# Patient Record
Sex: MALE | Race: BLACK OR AFRICAN AMERICAN | NOT HISPANIC OR LATINO | Employment: FULL TIME | URBAN - METROPOLITAN AREA
[De-identification: names, ages, dates, MRNs, and addresses within clinical notes are randomized per-mention and may not be internally consistent; named-entity substitution may affect disease eponyms.]

---

## 2020-09-16 ENCOUNTER — OFFICE VISIT (OUTPATIENT)
Dept: SURGERY | Facility: CLINIC | Age: 40
End: 2020-09-16
Payer: MEDICAID

## 2020-09-16 VITALS
HEIGHT: 76 IN | SYSTOLIC BLOOD PRESSURE: 159 MMHG | WEIGHT: 243.38 LBS | DIASTOLIC BLOOD PRESSURE: 66 MMHG | HEART RATE: 75 BPM | BODY MASS INDEX: 29.64 KG/M2

## 2020-09-16 DIAGNOSIS — K40.90 UNILATERAL INGUINAL HERNIA WITHOUT OBSTRUCTION OR GANGRENE, RECURRENCE NOT SPECIFIED: Primary | ICD-10-CM

## 2020-09-16 PROCEDURE — 99204 PR OFFICE/OUTPT VISIT, NEW, LEVL IV, 45-59 MIN: ICD-10-PCS | Mod: S$GLB,,, | Performed by: SURGERY

## 2020-09-16 PROCEDURE — 99204 OFFICE O/P NEW MOD 45 MIN: CPT | Mod: S$GLB,,, | Performed by: SURGERY

## 2020-09-16 RX ORDER — MUPIROCIN 20 MG/G
OINTMENT TOPICAL
Status: CANCELLED | OUTPATIENT
Start: 2020-09-16

## 2020-09-16 RX ORDER — SODIUM CHLORIDE 9 MG/ML
INJECTION, SOLUTION INTRAVENOUS CONTINUOUS
Status: CANCELLED | OUTPATIENT
Start: 2020-09-16

## 2020-09-16 NOTE — PROGRESS NOTES
"History & Physical    SUBJECTIVE:     History of Present Illness:  Patient is a 40 y.o. male presents with symptomatic right inguinal hernia.      Chief Complaint   Patient presents with    Consult    Inguinal Hernia     rt side        Review of patient's allergies indicates:  No Known Allergies    No current outpatient medications on file.     No current facility-administered medications for this visit.        History reviewed. No pertinent past medical history.  History reviewed. No pertinent surgical history.  History reviewed. No pertinent family history.  Social History     Tobacco Use    Smoking status: Never Smoker    Smokeless tobacco: Never Used   Substance Use Topics    Alcohol use: Not on file    Drug use: Not on file        Review of Systems:  Review of Systems   Constitutional: Negative.    HENT: Negative.    Eyes: Negative.    Respiratory: Negative.    Cardiovascular: Negative.    Gastrointestinal: Negative.    Endocrine: Negative.    Musculoskeletal: Negative.    Skin: Negative.    Allergic/Immunologic: Negative.    Neurological: Negative.    Hematological: Negative.    Psychiatric/Behavioral: Negative.    All other systems reviewed and are negative.      OBJECTIVE:     Vital Signs (Most Recent)  Pulse: 75 (09/16/20 1341)  BP: (!) 159/66 (09/16/20 1341)  6' 4" (1.93 m)  110.4 kg (243 lb 6.2 oz)     Physical Exam:  Physical Exam  Vitals signs reviewed.   Constitutional:       Appearance: He is well-developed.   HENT:      Head: Normocephalic and atraumatic.      Right Ear: External ear normal.      Left Ear: External ear normal.      Nose: Nose normal.   Eyes:      Conjunctiva/sclera: Conjunctivae normal.      Pupils: Pupils are equal, round, and reactive to light.   Neck:      Musculoskeletal: Normal range of motion and neck supple.   Cardiovascular:      Rate and Rhythm: Normal rate and regular rhythm.      Heart sounds: Normal heart sounds.   Pulmonary:      Effort: Pulmonary effort is normal. "      Breath sounds: Normal breath sounds.   Abdominal:      General: Bowel sounds are normal.      Palpations: Abdomen is soft.      Hernia: A hernia is present. Hernia is present in the right inguinal area.   Genitourinary:      Musculoskeletal: Normal range of motion.   Skin:     General: Skin is warm and dry.   Neurological:      Mental Status: He is alert and oriented to person, place, and time.      Deep Tendon Reflexes: Reflexes are normal and symmetric.   Psychiatric:         Behavior: Behavior normal.         Thought Content: Thought content normal.         Laboratory  none    Diagnostic Results:  none    ASSESSMENT/PLAN:     Right inguinal hernia    PLAN:Plan     To the OR for repair of right inguinal hernia with the risks explained and he agrees to proceed

## 2020-09-30 ENCOUNTER — HOSPITAL ENCOUNTER (OUTPATIENT)
Dept: PREADMISSION TESTING | Facility: HOSPITAL | Age: 40
Discharge: HOME | End: 2020-09-30
Attending: SURGERY
Payer: MEDICAID

## 2020-09-30 VITALS
OXYGEN SATURATION: 100 % | SYSTOLIC BLOOD PRESSURE: 146 MMHG | BODY MASS INDEX: 30.5 KG/M2 | HEIGHT: 76 IN | DIASTOLIC BLOOD PRESSURE: 85 MMHG | WEIGHT: 250.44 LBS | RESPIRATION RATE: 16 BRPM | HEART RATE: 55 BPM

## 2020-09-30 DIAGNOSIS — K40.90 UNILATERAL INGUINAL HERNIA WITHOUT OBSTRUCTION OR GANGRENE, RECURRENCE NOT SPECIFIED: ICD-10-CM

## 2020-09-30 LAB
ALBUMIN SERPL BCP-MCNC: 4 G/DL (ref 3.5–5.2)
ALP SERPL-CCNC: 54 U/L (ref 55–135)
ALT SERPL W/O P-5'-P-CCNC: 29 U/L (ref 10–44)
ANION GAP SERPL CALC-SCNC: 7 MMOL/L (ref 8–16)
AST SERPL-CCNC: 26 U/L (ref 10–40)
BASOPHILS # BLD AUTO: 0.02 K/UL (ref 0–0.2)
BASOPHILS NFR BLD: 0.2 % (ref 0–1.9)
BILIRUB SERPL-MCNC: 0.5 MG/DL (ref 0.1–1)
BUN SERPL-MCNC: 13 MG/DL (ref 6–20)
CALCIUM SERPL-MCNC: 8.9 MG/DL (ref 8.7–10.5)
CHLORIDE SERPL-SCNC: 105 MMOL/L (ref 95–110)
CO2 SERPL-SCNC: 26 MMOL/L (ref 23–29)
CREAT SERPL-MCNC: 1 MG/DL (ref 0.5–1.4)
DIFFERENTIAL METHOD: ABNORMAL
EOSINOPHIL # BLD AUTO: 0.1 K/UL (ref 0–0.5)
EOSINOPHIL NFR BLD: 1.5 % (ref 0–8)
ERYTHROCYTE [DISTWIDTH] IN BLOOD BY AUTOMATED COUNT: 15.4 % (ref 11.5–14.5)
EST. GFR  (AFRICAN AMERICAN): >60 ML/MIN/1.73 M^2
EST. GFR  (NON AFRICAN AMERICAN): >60 ML/MIN/1.73 M^2
GLUCOSE SERPL-MCNC: 88 MG/DL (ref 70–110)
HCT VFR BLD AUTO: 35.9 % (ref 40–54)
HGB BLD-MCNC: 11.7 G/DL (ref 14–18)
IMM GRANULOCYTES # BLD AUTO: 0.03 K/UL (ref 0–0.04)
IMM GRANULOCYTES NFR BLD AUTO: 0.3 % (ref 0–0.5)
LYMPHOCYTES # BLD AUTO: 3.5 K/UL (ref 1–4.8)
LYMPHOCYTES NFR BLD: 36.6 % (ref 18–48)
MCH RBC QN AUTO: 22.1 PG (ref 27–31)
MCHC RBC AUTO-ENTMCNC: 32.6 G/DL (ref 32–36)
MCV RBC AUTO: 68 FL (ref 82–98)
MONOCYTES # BLD AUTO: 1.1 K/UL (ref 0.3–1)
MONOCYTES NFR BLD: 11.6 % (ref 4–15)
NEUTROPHILS # BLD AUTO: 4.8 K/UL (ref 1.8–7.7)
NEUTROPHILS NFR BLD: 49.8 % (ref 38–73)
NRBC BLD-RTO: 0 /100 WBC
PLATELET # BLD AUTO: 218 K/UL (ref 150–350)
PMV BLD AUTO: 9.8 FL (ref 9.2–12.9)
POTASSIUM SERPL-SCNC: 3.8 MMOL/L (ref 3.5–5.1)
PROT SERPL-MCNC: 6.9 G/DL (ref 6–8.4)
RBC # BLD AUTO: 5.29 M/UL (ref 4.6–6.2)
SODIUM SERPL-SCNC: 138 MMOL/L (ref 136–145)
WBC # BLD AUTO: 9.54 K/UL (ref 3.9–12.7)

## 2020-09-30 PROCEDURE — 36415 COLL VENOUS BLD VENIPUNCTURE: CPT

## 2020-09-30 PROCEDURE — 80053 COMPREHEN METABOLIC PANEL: CPT

## 2020-09-30 PROCEDURE — 85025 COMPLETE CBC W/AUTO DIFF WBC: CPT

## 2020-09-30 NOTE — DISCHARGE INSTRUCTIONS
Your procedure  is scheduled for __10/6/2020________.    Call [phone] between 2pm and 5pm on _10/5/2020______to find out your arrival time for the day of surgery.    Report to the Emergency Department on the day of your surgery.  You will be escorted to the admitting unit.    Important instructions:   Do not eat or drink after 12 midnight, including water.  It is okay to brush your teeth.  Do not have gum, candy or mints.      Stop taking Aspirin, Ibuprofen, Motrin and Aleve , Fish oil, and Vitamin E for at least 7 days before your surgery. You may use Tylenol unless otherwise instructed by your doctor.          Return to patient registration through the Emergency Room as previously on__10/5/2020________ for  Covid test.       Please shower the night before and the morning of your surgery.        Use Hibiclens soap as instructed by your pre op nurse.   Please place clean linens on your bed the night before surgery. Please wear fresh clean clothing after each shower.     No shaving of procedural area at least 4-5 days before surgery due to increased risk of skin irritation and/or possible infection.      You may wear deodorant only.      Do not wear powder, body lotion or perfume/cologne.     Do not wear any jewelry or have any metal on your body.     You will be asked to remove any dentures or partials for the procedure.     Please bring any documents given to you by your doctor.     If you are going home on the same day of surgery, you must arrange for a family member or a friend to drive you home.  Public transportation is prohibited.  You will not be able to drive home if you were given anesthesia or sedation.     Wear loose fitting clothes allowing for bandages.     Please leave money and valuables home.       You may bring your cell phone.     Call the doctor if fever or illness should occur before your surgery.    Call [phone] to contact us here if needed.

## 2020-10-05 ENCOUNTER — ANESTHESIA EVENT (OUTPATIENT)
Dept: SURGERY | Facility: HOSPITAL | Age: 40
End: 2020-10-05
Payer: MEDICAID

## 2020-10-05 ENCOUNTER — HOSPITAL ENCOUNTER (OUTPATIENT)
Dept: PREADMISSION TESTING | Facility: HOSPITAL | Age: 40
Discharge: HOME | End: 2020-10-05
Attending: SURGERY
Payer: MEDICAID

## 2020-10-05 DIAGNOSIS — Z01.818 PREOP TESTING: ICD-10-CM

## 2020-10-05 LAB — SARS-COV-2 RDRP RESP QL NAA+PROBE: NEGATIVE

## 2020-10-05 PROCEDURE — U0002 COVID-19 LAB TEST NON-CDC: HCPCS

## 2020-10-06 ENCOUNTER — HOSPITAL ENCOUNTER (OUTPATIENT)
Facility: HOSPITAL | Age: 40
Discharge: HOME | End: 2020-10-06
Attending: SURGERY | Admitting: SURGERY
Payer: MEDICAID

## 2020-10-06 ENCOUNTER — ANESTHESIA (OUTPATIENT)
Dept: SURGERY | Facility: HOSPITAL | Age: 40
End: 2020-10-06
Payer: MEDICAID

## 2020-10-06 VITALS
RESPIRATION RATE: 20 BRPM | BODY MASS INDEX: 30.48 KG/M2 | WEIGHT: 250.44 LBS | DIASTOLIC BLOOD PRESSURE: 77 MMHG | TEMPERATURE: 98 F | HEART RATE: 60 BPM | SYSTOLIC BLOOD PRESSURE: 144 MMHG | OXYGEN SATURATION: 100 %

## 2020-10-06 DIAGNOSIS — Z01.818 PREOP TESTING: ICD-10-CM

## 2020-10-06 DIAGNOSIS — K40.90 UNILATERAL INGUINAL HERNIA WITHOUT OBSTRUCTION OR GANGRENE, RECURRENCE NOT SPECIFIED: Primary | ICD-10-CM

## 2020-10-06 LAB — POCT GLUCOSE: 103 MG/DL (ref 70–110)

## 2020-10-06 PROCEDURE — S0020 INJECTION, BUPIVICAINE HYDRO: HCPCS | Performed by: SURGERY

## 2020-10-06 PROCEDURE — 49505 PR REPAIR ING HERNIA,5+Y/O,REDUCIBL: ICD-10-PCS | Mod: RT,,, | Performed by: SURGERY

## 2020-10-06 PROCEDURE — D9220A PRA ANESTHESIA: Mod: ANES,,, | Performed by: ANESTHESIOLOGY

## 2020-10-06 PROCEDURE — 36000707: Performed by: SURGERY

## 2020-10-06 PROCEDURE — 00830 ANES HERNIA RPR LWR ABD NOS: CPT | Performed by: SURGERY

## 2020-10-06 PROCEDURE — 49505 PRP I/HERN INIT REDUC >5 YR: CPT | Mod: RT,,, | Performed by: SURGERY

## 2020-10-06 PROCEDURE — 25000003 PHARM REV CODE 250: Performed by: NURSE ANESTHETIST, CERTIFIED REGISTERED

## 2020-10-06 PROCEDURE — 63600175 PHARM REV CODE 636 W HCPCS: Performed by: SURGERY

## 2020-10-06 PROCEDURE — D9220A PRA ANESTHESIA: Mod: CRNA,,, | Performed by: NURSE ANESTHETIST, CERTIFIED REGISTERED

## 2020-10-06 PROCEDURE — 63600175 PHARM REV CODE 636 W HCPCS: Performed by: NURSE ANESTHETIST, CERTIFIED REGISTERED

## 2020-10-06 PROCEDURE — 37000009 HC ANESTHESIA EA ADD 15 MINS: Performed by: SURGERY

## 2020-10-06 PROCEDURE — 25000003 PHARM REV CODE 250: Performed by: SURGERY

## 2020-10-06 PROCEDURE — C1781 MESH (IMPLANTABLE): HCPCS | Performed by: SURGERY

## 2020-10-06 PROCEDURE — 37000008 HC ANESTHESIA 1ST 15 MINUTES: Performed by: SURGERY

## 2020-10-06 PROCEDURE — 36000706: Performed by: SURGERY

## 2020-10-06 PROCEDURE — 71000039 HC RECOVERY, EACH ADD'L HOUR: Performed by: SURGERY

## 2020-10-06 PROCEDURE — 88302 TISSUE EXAM BY PATHOLOGIST: CPT | Performed by: PATHOLOGY

## 2020-10-06 PROCEDURE — 88302 TISSUE EXAM BY PATHOLOGIST: CPT | Mod: 26,,, | Performed by: PATHOLOGY

## 2020-10-06 PROCEDURE — 82962 GLUCOSE BLOOD TEST: CPT | Performed by: SURGERY

## 2020-10-06 PROCEDURE — D9220A PRA ANESTHESIA: ICD-10-PCS | Mod: ANES,,, | Performed by: ANESTHESIOLOGY

## 2020-10-06 PROCEDURE — 88302 PR  SURG PATH,LEVEL II: ICD-10-PCS | Mod: 26,,, | Performed by: PATHOLOGY

## 2020-10-06 PROCEDURE — 71000016 HC POSTOP RECOV ADDL HR: Performed by: SURGERY

## 2020-10-06 PROCEDURE — D9220A PRA ANESTHESIA: ICD-10-PCS | Mod: CRNA,,, | Performed by: NURSE ANESTHETIST, CERTIFIED REGISTERED

## 2020-10-06 PROCEDURE — 71000033 HC RECOVERY, INTIAL HOUR: Performed by: SURGERY

## 2020-10-06 PROCEDURE — 63600175 PHARM REV CODE 636 W HCPCS: Performed by: ANESTHESIOLOGY

## 2020-10-06 PROCEDURE — 71000015 HC POSTOP RECOV 1ST HR: Performed by: SURGERY

## 2020-10-06 DEVICE — MESH PROLENE HERNIA 3IN: Type: IMPLANTABLE DEVICE | Site: GROIN | Status: FUNCTIONAL

## 2020-10-06 RX ORDER — MIDAZOLAM HYDROCHLORIDE 1 MG/ML
INJECTION, SOLUTION INTRAMUSCULAR; INTRAVENOUS
Status: DISCONTINUED | OUTPATIENT
Start: 2020-10-06 | End: 2020-10-06

## 2020-10-06 RX ORDER — NEOSTIGMINE METHYLSULFATE 1 MG/ML
INJECTION, SOLUTION INTRAVENOUS
Status: DISCONTINUED | OUTPATIENT
Start: 2020-10-06 | End: 2020-10-06

## 2020-10-06 RX ORDER — PROPOFOL 10 MG/ML
VIAL (ML) INTRAVENOUS
Status: DISCONTINUED | OUTPATIENT
Start: 2020-10-06 | End: 2020-10-06

## 2020-10-06 RX ORDER — ACETAMINOPHEN 10 MG/ML
1000 INJECTION, SOLUTION INTRAVENOUS ONCE
Status: COMPLETED | OUTPATIENT
Start: 2020-10-06 | End: 2020-10-06

## 2020-10-06 RX ORDER — OXYCODONE AND ACETAMINOPHEN 5; 325 MG/1; MG/1
1 TABLET ORAL EVERY 4 HOURS PRN
Status: DISCONTINUED | OUTPATIENT
Start: 2020-10-06 | End: 2020-10-06 | Stop reason: HOSPADM

## 2020-10-06 RX ORDER — FENTANYL CITRATE 50 UG/ML
INJECTION, SOLUTION INTRAMUSCULAR; INTRAVENOUS
Status: DISCONTINUED | OUTPATIENT
Start: 2020-10-06 | End: 2020-10-06

## 2020-10-06 RX ORDER — SODIUM CHLORIDE 0.9 % (FLUSH) 0.9 %
10 SYRINGE (ML) INJECTION
Status: DISCONTINUED | OUTPATIENT
Start: 2020-10-06 | End: 2020-10-06 | Stop reason: HOSPADM

## 2020-10-06 RX ORDER — MORPHINE SULFATE 10 MG/ML
3 INJECTION INTRAMUSCULAR; INTRAVENOUS; SUBCUTANEOUS
Status: DISCONTINUED | OUTPATIENT
Start: 2020-10-06 | End: 2020-10-06 | Stop reason: HOSPADM

## 2020-10-06 RX ORDER — OXYCODONE AND ACETAMINOPHEN 5; 325 MG/1; MG/1
1 TABLET ORAL EVERY 4 HOURS PRN
Qty: 30 TABLET | Refills: 0 | Status: SHIPPED | OUTPATIENT
Start: 2020-10-06

## 2020-10-06 RX ORDER — SODIUM CHLORIDE 9 MG/ML
INJECTION, SOLUTION INTRAVENOUS CONTINUOUS
Status: DISCONTINUED | OUTPATIENT
Start: 2020-10-06 | End: 2020-10-06 | Stop reason: HOSPADM

## 2020-10-06 RX ORDER — CEFAZOLIN SODIUM 2 G/50ML
2 SOLUTION INTRAVENOUS
Status: COMPLETED | OUTPATIENT
Start: 2020-10-06 | End: 2020-10-06

## 2020-10-06 RX ORDER — ROPIVACAINE/EPI/CLONIDINE/KET 2.46-0.005
SYRINGE (ML) INJECTION ONCE
Status: COMPLETED | OUTPATIENT
Start: 2020-10-06 | End: 2020-10-06

## 2020-10-06 RX ORDER — ROCURONIUM BROMIDE 10 MG/ML
INJECTION, SOLUTION INTRAVENOUS
Status: DISCONTINUED | OUTPATIENT
Start: 2020-10-06 | End: 2020-10-06

## 2020-10-06 RX ORDER — ONDANSETRON 2 MG/ML
4 INJECTION INTRAMUSCULAR; INTRAVENOUS DAILY PRN
Status: DISCONTINUED | OUTPATIENT
Start: 2020-10-06 | End: 2020-10-06 | Stop reason: HOSPADM

## 2020-10-06 RX ORDER — MUPIROCIN 20 MG/G
OINTMENT TOPICAL
Status: DISCONTINUED | OUTPATIENT
Start: 2020-10-06 | End: 2020-10-06 | Stop reason: HOSPADM

## 2020-10-06 RX ORDER — HYDROMORPHONE HYDROCHLORIDE 2 MG/ML
0.2 INJECTION, SOLUTION INTRAMUSCULAR; INTRAVENOUS; SUBCUTANEOUS EVERY 5 MIN PRN
Status: DISCONTINUED | OUTPATIENT
Start: 2020-10-06 | End: 2020-10-06 | Stop reason: HOSPADM

## 2020-10-06 RX ORDER — ROPIVACAINE/EPI/CLONIDINE/KET 2.46-0.005
SYRINGE (ML) INJECTION
Status: DISCONTINUED | OUTPATIENT
Start: 2020-10-06 | End: 2020-10-06 | Stop reason: HOSPADM

## 2020-10-06 RX ORDER — DEXAMETHASONE SODIUM PHOSPHATE 4 MG/ML
INJECTION, SOLUTION INTRA-ARTICULAR; INTRALESIONAL; INTRAMUSCULAR; INTRAVENOUS; SOFT TISSUE
Status: DISCONTINUED | OUTPATIENT
Start: 2020-10-06 | End: 2020-10-06

## 2020-10-06 RX ORDER — LIDOCAINE HCL/PF 100 MG/5ML
SYRINGE (ML) INTRAVENOUS
Status: DISCONTINUED | OUTPATIENT
Start: 2020-10-06 | End: 2020-10-06

## 2020-10-06 RX ORDER — BUPIVACAINE HYDROCHLORIDE 2.5 MG/ML
INJECTION, SOLUTION INFILTRATION; PERINEURAL
Status: DISCONTINUED | OUTPATIENT
Start: 2020-10-06 | End: 2020-10-06 | Stop reason: HOSPADM

## 2020-10-06 RX ORDER — PHENYLEPHRINE HYDROCHLORIDE 10 MG/ML
INJECTION INTRAVENOUS
Status: DISCONTINUED | OUTPATIENT
Start: 2020-10-06 | End: 2020-10-06

## 2020-10-06 RX ADMIN — LIDOCAINE HYDROCHLORIDE 50 MG: 20 INJECTION, SOLUTION INTRAVENOUS at 11:10

## 2020-10-06 RX ADMIN — ROCURONIUM BROMIDE 50 MG: 10 INJECTION, SOLUTION INTRAVENOUS at 11:10

## 2020-10-06 RX ADMIN — MUPIROCIN: 20 OINTMENT TOPICAL at 08:10

## 2020-10-06 RX ADMIN — FENTANYL CITRATE 25 MCG: 50 INJECTION INTRAMUSCULAR; INTRAVENOUS at 12:10

## 2020-10-06 RX ADMIN — GLYCOPYRROLATE 0.8 MG: 0.2 INJECTION, SOLUTION INTRAMUSCULAR; INTRAVITREAL at 12:10

## 2020-10-06 RX ADMIN — FENTANYL CITRATE 25 MCG: 50 INJECTION INTRAMUSCULAR; INTRAVENOUS at 11:10

## 2020-10-06 RX ADMIN — MIDAZOLAM HYDROCHLORIDE 2 MG: 1 INJECTION, SOLUTION INTRAMUSCULAR; INTRAVENOUS at 10:10

## 2020-10-06 RX ADMIN — HYDROMORPHONE HYDROCHLORIDE 0.2 MG: 2 INJECTION INTRAMUSCULAR; INTRAVENOUS; SUBCUTANEOUS at 12:10

## 2020-10-06 RX ADMIN — MIDAZOLAM HYDROCHLORIDE 2 MG: 1 INJECTION, SOLUTION INTRAMUSCULAR; INTRAVENOUS at 11:10

## 2020-10-06 RX ADMIN — OXYCODONE HYDROCHLORIDE AND ACETAMINOPHEN 1 TABLET: 5; 325 TABLET ORAL at 01:10

## 2020-10-06 RX ADMIN — DEXAMETHASONE SODIUM PHOSPHATE 4 MG: 4 INJECTION, SOLUTION INTRAMUSCULAR; INTRAVENOUS at 11:10

## 2020-10-06 RX ADMIN — Medication: at 12:10

## 2020-10-06 RX ADMIN — ACETAMINOPHEN 1000 MG: 10 INJECTION, SOLUTION INTRAVENOUS at 12:10

## 2020-10-06 RX ADMIN — FENTANYL CITRATE 100 MCG: 50 INJECTION INTRAMUSCULAR; INTRAVENOUS at 10:10

## 2020-10-06 RX ADMIN — SODIUM CHLORIDE: 0.9 INJECTION, SOLUTION INTRAVENOUS at 08:10

## 2020-10-06 RX ADMIN — PHENYLEPHRINE HYDROCHLORIDE 50 MCG: 10 INJECTION INTRAVENOUS at 11:10

## 2020-10-06 RX ADMIN — CEFAZOLIN SODIUM 2 G: 2 SOLUTION INTRAVENOUS at 11:10

## 2020-10-06 RX ADMIN — NEOSTIGMINE METHYLSULFATE 5 MG: 1 INJECTION INTRAVENOUS at 12:10

## 2020-10-06 RX ADMIN — PROPOFOL 200 MG: 10 INJECTION, EMULSION INTRAVENOUS at 11:10

## 2020-10-06 RX ADMIN — GLYCOPYRROLATE 0.2 MG: 0.2 INJECTION, SOLUTION INTRAMUSCULAR; INTRAVITREAL at 11:10

## 2020-10-06 NOTE — ANESTHESIA POSTPROCEDURE EVALUATION
Anesthesia Post Evaluation    Patient: Travis De Oliveira    Procedure(s) Performed: Procedure(s) (LRB):  REPAIR, HERNIA, INGUINAL, WITHOUT HISTORY OF PRIOR REPAIR, AGE 5 YEARS OR OLDER (Right)    Final Anesthesia Type: general    Patient location during evaluation: PACU  Patient participation: Yes- Able to Participate  Level of consciousness: awake and alert  Post-procedure vital signs: reviewed and stable  Pain management: adequate  Airway patency: patent    PONV status at discharge: No PONV  Anesthetic complications: no      Cardiovascular status: hemodynamically stable  Respiratory status: unassisted and spontaneous ventilation  Hydration status: euvolemic  Follow-up not needed.          Vitals Value Taken Time   /65 10/06/20 1320   Temp 36.6 °C (97.9 °F) 10/06/20 1221   Pulse 56 10/06/20 1324   Resp 11 10/06/20 1324   SpO2 100 % 10/06/20 1324   Vitals shown include unvalidated device data.      No case tracking events are documented in the log.      Pain/Libertad Score: Pain Rating Prior to Med Admin: 6 (10/6/2020 12:54 PM)  Pain Rating Post Med Admin: 5 (10/6/2020  1:00 PM)  Libertad Score: 9 (10/6/2020 12:51 PM)

## 2020-10-06 NOTE — BRIEF OP NOTE
[facility]  Brief Operative Note    Surgery Date: 10/6/2020     Surgeon(s) and Role:     * Rg Lopez MD - Primary     * Melody Perera MD - Resident - Assisting        Pre-op Diagnosis:  Unilateral inguinal hernia without obstruction or gangrene, recurrence not specified [K40.90]    Post-op Diagnosis:  Post-Op Diagnosis Codes:     * Unilateral inguinal hernia without obstruction or gangrene, recurrence not specified [K40.90]    Procedure(s) (LRB):  REPAIR, HERNIA, INGUINAL, WITHOUT HISTORY OF PRIOR REPAIR, AGE 5 YEARS OR OLDER (Right)    Anesthesia: General    Description of the findings of the procedure(s): indirect inguinal hernia    Estimated Blood Loss: minimal         Specimens:   Specimen (12h ago, onward)    None            Discharge Note    OUTCOME: Patient tolerated treatment/procedure well without complication and is now ready for discharge.    DISPOSITION: Home or Self Care    FINAL DIAGNOSIS:  Unilateral inguinal hernia without obstruction or gangrene    FOLLOWUP: In clinic    DISCHARGE INSTRUCTIONS:    Discharge Procedure Orders   Diet general     Call MD for:  temperature >100.4     Call MD for:  persistent nausea and vomiting     Call MD for:  severe uncontrolled pain     Call MD for:  difficulty breathing, headache or visual disturbances     Call MD for:  redness, tenderness, or signs of infection (pain, swelling, redness, odor or green/yellow discharge around incision site)     Call MD for:  hives     Remove dressing in 24 hours     Shower on day dressing removed (No bath)        Clinical Reference Documents Added to Patient Instructions       Document    HERNIA REPAIR (OPEN), DISCHARGE INSTRUCTIONS (ENGLISH)

## 2020-10-06 NOTE — ANESTHESIA PREPROCEDURE EVALUATION
10/06/2020  Travis De Oliveira is a 40 y.o., male.    Anesthesia Evaluation    I have reviewed the Patient Summary Reports.    I have reviewed the Nursing Notes.       Review of Systems  Anesthesia Hx:  No problems with previous Anesthesia  Denies Family Hx of Anesthesia complications.   Denies Personal Hx of Anesthesia complications.   Social:  Smoker, No Alcohol Use    Cardiovascular:   Exercise tolerance: good Denies Hypertension.  Denies MI.    Denies Angina.        Pulmonary:  Pulmonary Normal    Renal/:  Renal/ Normal     Hepatic/GI:  Hepatic/GI Normal    Musculoskeletal:   Right inguinal hernia   Neurological:  Neurology Normal    Endocrine:  Endocrine Normal        Physical Exam  General:  Well nourished    Airway/Jaw/Neck:  Airway Findings: Mouth Opening: Normal Tongue: Normal  Mallampati: II  TM Distance: Normal, at least 6 cm      Dental:  Dental Findings: In tact    Chest/Lungs:  Chest/Lungs Findings: Normal Respiratory Rate, Clear to auscultation     Heart/Vascular:  Heart Findings: Rate: Bradycardia  Rhythm: Regular Rhythm        Mental Status:  Mental Status Findings:  Cooperative, Alert and Oriented       Wt Readings from Last 3 Encounters:   10/05/20 113.6 kg (250 lb 7.1 oz)   09/30/20 113.6 kg (250 lb 7.1 oz)   09/16/20 110.4 kg (243 lb 6.2 oz)     Temp Readings from Last 3 Encounters:   05/31/16 36.7 °C (98 °F) (Oral)     BP Readings from Last 3 Encounters:   09/30/20 (!) 146/85   09/16/20 (!) 159/66   05/31/16 140/74     Pulse Readings from Last 3 Encounters:   09/30/20 (!) 55   09/16/20 75   05/31/16 (!) 57     Lab Results   Component Value Date    WBC 9.54 09/30/2020    HGB 11.7 (L) 09/30/2020    HCT 35.9 (L) 09/30/2020    MCV 68 (L) 09/30/2020     09/30/2020       CMP  Sodium   Date Value Ref Range Status   09/30/2020 138 136 - 145 mmol/L Final     Potassium   Date Value Ref  Range Status   09/30/2020 3.8 3.5 - 5.1 mmol/L Final     Chloride   Date Value Ref Range Status   09/30/2020 105 95 - 110 mmol/L Final     CO2   Date Value Ref Range Status   09/30/2020 26 23 - 29 mmol/L Final     Glucose   Date Value Ref Range Status   09/30/2020 88 70 - 110 mg/dL Final     BUN, Bld   Date Value Ref Range Status   09/30/2020 13 6 - 20 mg/dL Final     Creatinine   Date Value Ref Range Status   09/30/2020 1.0 0.5 - 1.4 mg/dL Final     Calcium   Date Value Ref Range Status   09/30/2020 8.9 8.7 - 10.5 mg/dL Final     Total Protein   Date Value Ref Range Status   09/30/2020 6.9 6.0 - 8.4 g/dL Final     Albumin   Date Value Ref Range Status   09/30/2020 4.0 3.5 - 5.2 g/dL Final     Total Bilirubin   Date Value Ref Range Status   09/30/2020 0.5 0.1 - 1.0 mg/dL Final     Comment:     For infants and newborns, interpretation of results should be based  on gestational age, weight and in agreement with clinical  observations.  Premature Infant recommended reference ranges:  Up to 24 hours.............<8.0 mg/dL  Up to 48 hours............<12.0 mg/dL  3-5 days..................<15.0 mg/dL  6-29 days.................<15.0 mg/dL       Alkaline Phosphatase   Date Value Ref Range Status   09/30/2020 54 (L) 55 - 135 U/L Final     AST   Date Value Ref Range Status   09/30/2020 26 10 - 40 U/L Final     ALT   Date Value Ref Range Status   09/30/2020 29 10 - 44 U/L Final     Anion Gap   Date Value Ref Range Status   09/30/2020 7 (L) 8 - 16 mmol/L Final     eGFR if    Date Value Ref Range Status   09/30/2020 >60 >60 mL/min/1.73 m^2 Final     eGFR if non    Date Value Ref Range Status   09/30/2020 >60 >60 mL/min/1.73 m^2 Final     Comment:     Calculation used to obtain the estimated glomerular filtration  rate (eGFR) is the CKD-EPI equation.        10/5/2020 COVID negative    Anesthesia Plan  Type of Anesthesia, risks & benefits discussed:  Anesthesia Type:  general  Patient's Preference:    Intra-op Monitoring Plan: standard ASA monitors  Intra-op Monitoring Plan Comments:   Post Op Pain Control Plan: multimodal analgesia and per primary service following discharge from PACU  Post Op Pain Control Plan Comments:   Induction:   IV  Beta Blocker:  Patient is not currently on a Beta-Blocker (No further documentation required).       Informed Consent: Patient understands risks and agrees with Anesthesia plan.  Questions answered. Anesthesia consent signed with patient.  ASA Score: 2     Day of Surgery Review of History & Physical: I have interviewed and examined the patient. I have reviewed the patient's H&P dated:            Ready For Surgery From Anesthesia Perspective.

## 2020-10-06 NOTE — PLAN OF CARE
Libertad 10/10. AAOx4. VSS per flow sheet. Pain level 5/10; tolerable per patient.   Perineo tape intact to right lower abdomen; cdi.   No n/v; due to void. See chart for full assessment. Hand off report to ELIZABETH Diez.

## 2020-10-06 NOTE — OP NOTE
Inguinal hernia      DATE OF PROCEDURE: 10/06/2020     PREOPERATIVE DIAGNOSIS: Right inguinal hernia.     POSTOPERATIVE DIAGNOSIS: same     PROCEDURE PERFORMED: Right inguinal hernia repair.     SURGEON: Rg Lopez M.D.    ASST: Melody Perera MD     ANESTHESIA: General.     BLOOD LOSS: Minimal.     DESCRIPTION OF OPERATION: The patient was brought to the Operating Room, placed  on the operating room table in supine position. Under adequate general   anesthesia, prepped and draped around his right groin in the usual sterile   fashion. An ilioinguinal block was done in the patient's anterior superior   iliac spine transdermally with DAVID. The patient then had an incision made   in his groin, deepened to expose the external oblique aponeurosis. This was   divided. Spermatic cord was isolated. A small sac was identified and ligated.   The preperitoneal space was then entered and a Prolene hernia system mesh was   placed. The anterior portion of it was then placed in the inguinal canal and   tacked down in several spaces. A small slit was made to transmit the spermatic   cord. The external oblique aponeurosis was then reapproximated along with the   subcutaneous tissue and skin all in layers with absorbable suture. Prineo tape   was used as well as a bandage. The patient was awakened and transported to the   Recovery Room in satisfactory condition.

## 2020-10-06 NOTE — TRANSFER OF CARE
Anesthesia Transfer of Care Note    Patient: Travis De Oliveira    Procedure(s) Performed: Procedure(s) (LRB):  REPAIR, HERNIA, INGUINAL, WITHOUT HISTORY OF PRIOR REPAIR, AGE 5 YEARS OR OLDER (Right)    Patient location: PACU    Anesthesia Type: general    Transport from OR: Transported from OR on 100% O2 by closed face mask with adequate spontaneous ventilation    Post pain: adequate analgesia    Post assessment: no apparent anesthetic complications and tolerated procedure well    Post vital signs: stable    Level of consciousness: sedated and responds to stimulation    Nausea/Vomiting: no nausea/vomiting    Complications: none    Transfer of care protocol was followed      Last vitals:   Visit Vitals  /77 (BP Location: Right arm, Patient Position: Lying)   Pulse 71   Temp 36.6 °C (97.9 °F) (Oral)   Resp 12   Wt 113.6 kg (250 lb 7.1 oz)   SpO2 100%   BMI 30.48 kg/m²

## 2020-10-06 NOTE — INTERVAL H&P NOTE
The patient has been examined and the H&P has been reviewed:    I concur with the findings and no changes have occurred since H&P was written.    Surgery risks, benefits and alternative options discussed and understood by patient/family.          Active Hospital Problems    Diagnosis  POA    Unilateral inguinal hernia without obstruction or gangrene [K40.90]  Yes      Resolved Hospital Problems   No resolved problems to display.

## 2020-10-08 LAB
FINAL PATHOLOGIC DIAGNOSIS: NORMAL
GROSS: NORMAL
Lab: NORMAL

## 2020-10-13 ENCOUNTER — OFFICE VISIT (OUTPATIENT)
Dept: SURGERY | Facility: CLINIC | Age: 40
End: 2020-10-13
Payer: MEDICAID

## 2020-10-13 VITALS
DIASTOLIC BLOOD PRESSURE: 91 MMHG | SYSTOLIC BLOOD PRESSURE: 119 MMHG | BODY MASS INDEX: 30.44 KG/M2 | HEIGHT: 76 IN | HEART RATE: 78 BPM | WEIGHT: 250 LBS

## 2020-10-13 DIAGNOSIS — K40.90 UNILATERAL INGUINAL HERNIA WITHOUT OBSTRUCTION OR GANGRENE, RECURRENCE NOT SPECIFIED: Primary | ICD-10-CM

## 2020-10-13 PROCEDURE — 99024 PR POST-OP FOLLOW-UP VISIT: ICD-10-PCS | Mod: S$GLB,,, | Performed by: SURGERY

## 2020-10-13 PROCEDURE — 99024 POSTOP FOLLOW-UP VISIT: CPT | Mod: S$GLB,,, | Performed by: SURGERY

## 2020-10-13 NOTE — PROGRESS NOTES
Subjective:       Patient ID: Travis De Oliveira is a 40 y.o. male.    Chief Complaint: Post-op Evaluation    HPI 39 yo male s/p inguinal hernia repair without complaints  Review of Systems   Constitutional: Negative.    HENT: Negative.    Eyes: Negative.    Respiratory: Negative.    Cardiovascular: Negative.    Gastrointestinal: Negative.    Endocrine: Negative.    Musculoskeletal: Negative.    Integumentary:  Negative.   Allergic/Immunologic: Negative.    Neurological: Negative.    Hematological: Negative.    Psychiatric/Behavioral: Negative.    All other systems reviewed and are negative.        Objective:      Physical Exam  Vitals signs reviewed.   Constitutional:       Appearance: He is well-developed.   HENT:      Head: Normocephalic and atraumatic.      Right Ear: External ear normal.      Left Ear: External ear normal.      Nose: Nose normal.   Eyes:      Conjunctiva/sclera: Conjunctivae normal.      Pupils: Pupils are equal, round, and reactive to light.   Neck:      Musculoskeletal: Normal range of motion and neck supple.   Cardiovascular:      Rate and Rhythm: Normal rate and regular rhythm.      Heart sounds: Normal heart sounds.   Pulmonary:      Effort: Pulmonary effort is normal.      Breath sounds: Normal breath sounds.   Abdominal:      General: Bowel sounds are normal.      Palpations: Abdomen is soft.   Genitourinary:      Musculoskeletal: Normal range of motion.   Skin:     General: Skin is warm and dry.   Neurological:      Mental Status: He is alert and oriented to person, place, and time.      Deep Tendon Reflexes: Reflexes are normal and symmetric.   Psychiatric:         Behavior: Behavior normal.         Thought Content: Thought content normal.         Assessment:       1. Unilateral inguinal hernia without obstruction or gangrene, recurrence not specified      doing well  Plan:       I will see him back prn and urged him still take it easy

## 2020-10-19 ENCOUNTER — TELEPHONE (OUTPATIENT)
Dept: SURGERY | Facility: CLINIC | Age: 40
End: 2020-10-19

## 2020-10-19 NOTE — TELEPHONE ENCOUNTER
Pt contacted office regarding a work letter stating he's able to work. Pt advised that I would write the letter as well as, fax it to the fax number provided.   ----- Message from Bryanna Child sent at 10/19/2020 12:00 PM CDT -----      Name of Who is Calling: JAZMIN MCINTOSH [[id number]]      What is the request in detail: Pt called say's he has a question regarding previous surgery.Please contact to further discuss and advise.          Can the clinic reply by [facility]: N      What Number to Call Back if not in [facility]: [phone]

## 2020-10-20 ENCOUNTER — NURSE TRIAGE (OUTPATIENT)
Dept: ADMINISTRATIVE | Facility: CLINIC | Age: 40
End: 2020-10-20

## 2020-10-20 NOTE — TELEPHONE ENCOUNTER
Post-procedure Monitoring call to [phone]: patient not available at this time; advise family member that we would try back; phone number verified; no contact.    Reason for Disposition   Message left with person in household    Protocols used: NO CONTACT OR DUPLICATE CONTACT CALL-A-[place]

## 2021-09-14 ENCOUNTER — HOSPITAL ENCOUNTER (EMERGENCY)
Facility: HOSPITAL | Age: 41
Discharge: HOME | End: 2021-09-14
Attending: EMERGENCY MEDICINE
Payer: MEDICAID

## 2021-09-14 VITALS
BODY MASS INDEX: 31.05 KG/M2 | SYSTOLIC BLOOD PRESSURE: 156 MMHG | DIASTOLIC BLOOD PRESSURE: 88 MMHG | TEMPERATURE: 99 F | HEIGHT: 76 IN | RESPIRATION RATE: 17 BRPM | OXYGEN SATURATION: 99 % | WEIGHT: 255 LBS | HEART RATE: 60 BPM

## 2021-09-14 DIAGNOSIS — Z72.0 TOBACCO ABUSE: ICD-10-CM

## 2021-09-14 DIAGNOSIS — J40 BRONCHITIS: Primary | ICD-10-CM

## 2021-09-14 DIAGNOSIS — J06.9 VIRAL URI WITH COUGH: ICD-10-CM

## 2021-09-14 LAB
CTP QC/QA: YES
SARS-COV-2 RDRP RESP QL NAA+PROBE: NEGATIVE

## 2021-09-14 PROCEDURE — U0002 COVID-19 LAB TEST NON-CDC: HCPCS | Mod: ER | Performed by: EMERGENCY MEDICINE

## 2021-09-14 PROCEDURE — 99284 EMERGENCY DEPT VISIT MOD MDM: CPT | Mod: ER

## 2021-09-14 RX ORDER — DOXYCYCLINE 100 MG/1
100 CAPSULE ORAL 2 TIMES DAILY
Qty: 20 CAPSULE | Refills: 0 | Status: SHIPPED | OUTPATIENT
Start: 2021-09-14 | End: 2021-09-24

## 2021-09-14 RX ORDER — ALBUTEROL SULFATE 90 UG/1
1-2 AEROSOL, METERED RESPIRATORY (INHALATION) EVERY 6 HOURS PRN
Qty: 18 G | Refills: 0 | Status: SHIPPED | OUTPATIENT
Start: 2021-09-14 | End: 2022-09-14

## 2021-09-14 RX ORDER — CETIRIZINE HYDROCHLORIDE 10 MG/1
10 TABLET ORAL DAILY
Qty: 30 TABLET | Refills: 0 | Status: SHIPPED | OUTPATIENT
Start: 2021-09-14 | End: 2022-09-14

## 2021-09-14 RX ORDER — PREDNISONE 50 MG/1
50 TABLET ORAL DAILY
Qty: 5 TABLET | Refills: 0 | Status: SHIPPED | OUTPATIENT
Start: 2021-09-14 | End: 2021-09-19

## 2021-09-14 RX ORDER — GUAIFENESIN 100 MG/5ML
100-200 SOLUTION ORAL EVERY 4 HOURS PRN
Qty: 60 ML | Refills: 0 | Status: SHIPPED | OUTPATIENT
Start: 2021-09-14 | End: 2021-09-24

## 2024-03-30 ENCOUNTER — HOSPITAL ENCOUNTER (EMERGENCY)
Facility: HOSPITAL | Age: 44
Discharge: HOME | End: 2024-03-30
Attending: EMERGENCY MEDICINE
Payer: COMMERCIAL

## 2024-03-30 VITALS
BODY MASS INDEX: 28.34 KG/M2 | HEART RATE: 65 BPM | SYSTOLIC BLOOD PRESSURE: 139 MMHG | WEIGHT: 240 LBS | DIASTOLIC BLOOD PRESSURE: 87 MMHG | HEIGHT: 77 IN | OXYGEN SATURATION: 98 % | RESPIRATION RATE: 20 BRPM | TEMPERATURE: 99 F
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
## 2024-03-30 DIAGNOSIS — M79.605 LEFT LEG PAIN: ICD-10-CM

## 2024-03-30 PROCEDURE — 96372 THER/PROPH/DIAG INJ SC/IM: CPT | Performed by: PHYSICIAN ASSISTANT

## 2024-03-30 PROCEDURE — 99284 EMERGENCY DEPT VISIT MOD MDM: CPT | Mod: 25

## 2024-03-30 PROCEDURE — 63600175 PHARM REV CODE 636 W HCPCS: Performed by: PHYSICIAN ASSISTANT

## 2024-03-30 RX ORDER — METHOCARBAMOL 750 MG/1
1500 TABLET, FILM COATED ORAL 3 TIMES DAILY
Qty: 12 TABLET | Refills: 0 | Status: SHIPPED | OUTPATIENT
Start: 2024-03-30 | End: 2024-04-01

## 2024-03-30 RX ORDER — KETOROLAC TROMETHAMINE 30 MG/ML
30 INJECTION, SOLUTION INTRAMUSCULAR; INTRAVENOUS
Status: COMPLETED | OUTPATIENT
Start: 2024-03-30 | End: 2024-03-30

## 2024-03-30 RX ORDER — NAPROXEN 500 MG/1
500 TABLET ORAL 2 TIMES DAILY WITH MEALS
Qty: 14 TABLET | Refills: 0 | Status: SHIPPED | OUTPATIENT
Start: 2024-03-30 | End: 2024-04-06

## 2024-03-30 RX ADMIN — KETOROLAC TROMETHAMINE 30 MG: 30 INJECTION, SOLUTION INTRAMUSCULAR at 04:03

## 2024-03-30 NOTE — DISCHARGE INSTRUCTIONS
Please take the prescribed medications according to the directions on the bottle.  Continue to ice your leg for 15 minutes at a time a few times a day.  If your symptoms worsen you may return to the ED for reevaluation. Otherwise, please follow up with your primary care doctor within 1 week.
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
## 2025-02-16 ENCOUNTER — HOSPITAL ENCOUNTER (EMERGENCY)
Facility: HOSPITAL | Age: 45
Discharge: HOME | End: 2025-02-16
Attending: STUDENT IN AN ORGANIZED HEALTH CARE EDUCATION/TRAINING PROGRAM | Admitting: STUDENT IN AN ORGANIZED HEALTH CARE EDUCATION/TRAINING PROGRAM

## 2025-02-16 VITALS
DIASTOLIC BLOOD PRESSURE: 85 MMHG | TEMPERATURE: 98 F | HEART RATE: 64 BPM | OXYGEN SATURATION: 97 % | WEIGHT: 230 LBS | BODY MASS INDEX: 27.16 KG/M2 | SYSTOLIC BLOOD PRESSURE: 129 MMHG | HEIGHT: 77 IN | RESPIRATION RATE: 18 BRPM

## 2025-02-16 DIAGNOSIS — S02.32XA CLOSED FRACTURE OF LEFT ORBITAL FLOOR, INITIAL ENCOUNTER: ICD-10-CM

## 2025-02-16 DIAGNOSIS — H05.239 RETROBULBAR HEMATOMA: Primary | ICD-10-CM

## 2025-02-16 DIAGNOSIS — S02.832A CLOSED FRACTURE OF MEDIAL WALL OF LEFT ORBIT, INITIAL ENCOUNTER: ICD-10-CM

## 2025-02-16 PROBLEM — S02.85XA ORBITAL FRACTURE: Status: ACTIVE | Noted: 2025-02-16

## 2025-02-16 PROCEDURE — 25000003 PHARM REV CODE 250: Mod: ER | Performed by: STUDENT IN AN ORGANIZED HEALTH CARE EDUCATION/TRAINING PROGRAM

## 2025-02-16 PROCEDURE — 96375 TX/PRO/DX INJ NEW DRUG ADDON: CPT | Mod: ER

## 2025-02-16 PROCEDURE — 63600175 PHARM REV CODE 636 W HCPCS: Mod: ER | Performed by: STUDENT IN AN ORGANIZED HEALTH CARE EDUCATION/TRAINING PROGRAM

## 2025-02-16 PROCEDURE — 96374 THER/PROPH/DIAG INJ IV PUSH: CPT | Mod: ER

## 2025-02-16 PROCEDURE — 25500020 PHARM REV CODE 255: Performed by: STUDENT IN AN ORGANIZED HEALTH CARE EDUCATION/TRAINING PROGRAM

## 2025-02-16 PROCEDURE — 25000003 PHARM REV CODE 250: Performed by: EMERGENCY MEDICINE

## 2025-02-16 PROCEDURE — 25000003 PHARM REV CODE 250

## 2025-02-16 PROCEDURE — 99285 EMERGENCY DEPT VISIT HI MDM: CPT | Mod: 25

## 2025-02-16 RX ORDER — METHYLPREDNISOLONE SOD SUCC 125 MG
250 VIAL (EA) INJECTION
Status: COMPLETED | OUTPATIENT
Start: 2025-02-16 | End: 2025-02-16

## 2025-02-16 RX ORDER — AMOXICILLIN AND CLAVULANATE POTASSIUM 875; 125 MG/1; MG/1
1 TABLET, FILM COATED ORAL 2 TIMES DAILY
Qty: 14 TABLET | Refills: 0 | Status: SHIPPED | OUTPATIENT
Start: 2025-02-16 | End: 2025-02-16

## 2025-02-16 RX ORDER — PROPARACAINE HYDROCHLORIDE 5 MG/ML
1 SOLUTION/ DROPS OPHTHALMIC
Status: COMPLETED | OUTPATIENT
Start: 2025-02-16 | End: 2025-02-16

## 2025-02-16 RX ORDER — OXYCODONE AND ACETAMINOPHEN 5; 325 MG/1; MG/1
1 TABLET ORAL
Refills: 0 | Status: COMPLETED | OUTPATIENT
Start: 2025-02-16 | End: 2025-02-16

## 2025-02-16 RX ORDER — OXYCODONE HYDROCHLORIDE 5 MG/1
5 TABLET ORAL
Refills: 0 | Status: COMPLETED | OUTPATIENT
Start: 2025-02-16 | End: 2025-02-16

## 2025-02-16 RX ORDER — NAPROXEN 500 MG/1
500 TABLET ORAL 2 TIMES DAILY WITH MEALS
Qty: 30 TABLET | Refills: 0 | Status: SHIPPED | OUTPATIENT
Start: 2025-02-16 | End: 2025-02-16 | Stop reason: ALTCHOICE

## 2025-02-16 RX ORDER — SULFAMETHOXAZOLE AND TRIMETHOPRIM 800; 160 MG/1; MG/1
1 TABLET ORAL
Status: COMPLETED | OUTPATIENT
Start: 2025-02-16 | End: 2025-02-16

## 2025-02-16 RX ORDER — OXYCODONE AND ACETAMINOPHEN 5; 325 MG/1; MG/1
1 TABLET ORAL EVERY 6 HOURS PRN
Qty: 12 TABLET | Refills: 0 | Status: SHIPPED | OUTPATIENT
Start: 2025-02-16 | End: 2025-02-19

## 2025-02-16 RX ORDER — MORPHINE SULFATE 4 MG/ML
6 INJECTION, SOLUTION INTRAMUSCULAR; INTRAVENOUS
Refills: 0 | Status: COMPLETED | OUTPATIENT
Start: 2025-02-16 | End: 2025-02-16

## 2025-02-16 RX ORDER — ONDANSETRON HYDROCHLORIDE 2 MG/ML
4 INJECTION, SOLUTION INTRAVENOUS
Status: COMPLETED | OUTPATIENT
Start: 2025-02-16 | End: 2025-02-16

## 2025-02-16 RX ORDER — ACETAMINOPHEN 325 MG/1
650 TABLET ORAL
Status: COMPLETED | OUTPATIENT
Start: 2025-02-16 | End: 2025-02-16

## 2025-02-16 RX ORDER — AMOXICILLIN AND CLAVULANATE POTASSIUM 875; 125 MG/1; MG/1
1 TABLET, FILM COATED ORAL 2 TIMES DAILY
Qty: 14 TABLET | Refills: 0 | Status: SHIPPED | OUTPATIENT
Start: 2025-02-16 | End: 2025-02-23

## 2025-02-16 RX ADMIN — FLUORESCEIN SODIUM 1 EACH: 1 STRIP OPHTHALMIC at 01:02

## 2025-02-16 RX ADMIN — PROPARACAINE HYDROCHLORIDE 1 DROP: 5 SOLUTION/ DROPS OPHTHALMIC at 10:02

## 2025-02-16 RX ADMIN — MORPHINE SULFATE 6 MG: 4 INJECTION, SOLUTION INTRAMUSCULAR; INTRAVENOUS at 02:02

## 2025-02-16 RX ADMIN — FLUORESCEIN SODIUM 5 EACH: 1 STRIP OPHTHALMIC at 05:02

## 2025-02-16 RX ADMIN — METHYLPREDNISOLONE SODIUM SUCCINATE 250 MG: 125 INJECTION, POWDER, FOR SOLUTION INTRAMUSCULAR; INTRAVENOUS at 02:02

## 2025-02-16 RX ADMIN — OXYCODONE HYDROCHLORIDE AND ACETAMINOPHEN 1 TABLET: 5; 325 TABLET ORAL at 01:02

## 2025-02-16 RX ADMIN — OXYCODONE 5 MG: 5 TABLET ORAL at 08:02

## 2025-02-16 RX ADMIN — SULFAMETHOXAZOLE AND TRIMETHOPRIM 1 TABLET: 800; 160 TABLET ORAL at 01:02

## 2025-02-16 RX ADMIN — ONDANSETRON 4 MG: 2 INJECTION INTRAMUSCULAR; INTRAVENOUS at 02:02

## 2025-02-16 RX ADMIN — IOHEXOL 75 ML: 350 INJECTION, SOLUTION INTRAVENOUS at 05:02

## 2025-02-16 RX ADMIN — PROPARACAINE HYDROCHLORIDE 1 DROP: 5 SOLUTION/ DROPS OPHTHALMIC at 04:02

## 2025-02-16 RX ADMIN — ACETAMINOPHEN 650 MG: 325 TABLET ORAL at 08:02
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
## 2025-02-16 NOTE — ED NOTES
Assumed care for pt after recieving report from previous RN. Pt resting in bed in NAD, RR e/u. Vital signs stable and WDL at this time of assessment. Pt offered bathroom assistance and denies need at this time. Patient ambulates independently with steady gait. Explanation of care/wait provided. Pt verbalizes no needs at this time. Bed in low, locked position with rails up and call bell in reach.

## 2025-02-16 NOTE — ED NOTES
Pt ambulatory to POV with wife for transport to [facility] for ED to ED transfer. Wheelchair offered, pt declined. Independent, steady gait. Provided with face sheet and transfer form. PIV removed prior to departure. ED called and notified that pt is coming POV. Anticipating pt arrival.
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
## 2025-02-16 NOTE — DISCHARGE INSTRUCTIONS
Do not blow your nose  Use Afrin spray  2-3 times daily for 3 days.  Apply ice packs to eyelids for 20 minutes every 1-2 hours for the first 24-48 hours.  Please incline at 30 degree incline when at rest     Return to the emergency department if your symptoms worsen or you began developing persistent fevers/chills, nausea/vomiting, severe headache, worsening changes in vision or acute vision loss.    You should follow-up with ophthalmology (eye doctor) and ENT. Please call ASAP to schedule your appointments.    The contact information is listed in your discharge paperwork. You have been prescribed a short course of antibiotics to help prevent an infection after a facial bone fracture.    You can take Tylenol 650 mg every 8 hours for pain.    I have prescribed Percocet, an opiate pain medication for severe pain. Do not drive, drink alcohol, take other sedating medications while taking opiate pain medication. It is also constipating, so please take a  stool softener while taking this medication.    SINUS PRECAUTIONS   Do not blow or pinch your nose! If you have a runny nose, wipe your nose gently.   Do not place a tissue or any other object inside your nose for 4 weeks.   Avoid any aggressive or unnecessary manipulation of your nose.   Try to avoid sneezing. If you do sneeze, sneeze with your mouth open to avoid pressure build up.   Do not use a straw.    Do not smoke or use smokeless tobacco.   Avoid bending over -- try to keep your head above the level of your heart.   Avoid strenuous activity for 2 weeks -- do not strain by pushing or lifting heavy objects (>10 lbs).   Sleep with your head slightly raised.   Avoid the following activities: Swimming, scuba diving, playing a wind instrument, blowing up balloons, or other things that cause pressure changes in your mouth.

## 2025-02-16 NOTE — Clinical Note
"Travis"Andres De Oliveira was seen and treated in our emergency department on 2/16/2025.  He may return to work on 02/19/2025.       If you have any questions or concerns, please don't hesitate to call.      Charlene Knott MD"

## 2025-02-16 NOTE — LETTER
Patient: Travis De Oliveira  YOB: 1980  Date: 2/16/2025 Time: 2:53 AM  Location: [facility]    Leaving the Hospital Against Medical Advice    Chart #:[id number]    This will certify that I, the undersigned,    ______________________________________________________________________    A patient in the above named medical center, having requested discharge and removal from the medical center against the advice of my attending physician(s), hereby release [facility], its physicians, officers and employees, severally and individually, from any and all liability of any nature whatsoever for any injury or harm or complication of any kind that may result directly or indirectly, by reason of my terminating my stay as a patient at [facility] and my departure from [facility], and hereby waive any and all rights of action I may now have or later acquire as a result of my voluntary departure from [facility] and the termination of my stay as a patient therein.    This release is made with the full knowledge of the danger that may result from the action which I am taking.      Date:_______________________                         ___________________________                                                                                    Patient/Legal Representative    Witness:        ____________________________                          ___________________________  Nurse                                                                        Physician
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
## 2025-02-16 NOTE — ED NOTES
Received report and assumed care of patient at this time.  Patient is AAOx4 with a calm affect and aware of environment. Airway is open and patent, respirations are spontaneous, normal effort and rate noted. Pt denies chest pain at this time. Skin warm and dry. Movement to all extremities noted. Bed placed in low position, side rails up x 2, call light is within reach of patient. Explanation of care provided to patient, pt placed on BP, pulse-ox and cardiac monitoring. Patient offers no complaints at this time. Awaiting further MD orders and bed assignment, POC continues.
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
## 2025-02-16 NOTE — PROVIDER PROGRESS NOTES - EMERGENCY DEPT.
Encounter Date: 2/16/2025    ED Physician Progress Notes        ED Physician Hand-off Note:    ED Course: I assumed care of patient from off-going ED physician team. Briefly, Patient is a 45 yo M who sustained   Comminuted depressed fracture of the left orbital floor.  Minimally nasally-displaced fracture of the left medial orbital wall.  Left retro bulbar hematoma with left proptosis and substantial stretching of the left optic nerve    At the time of signout plan was pending - CT orbits, ophtho recommendations.    Medications given in the ED:    Medications   fluorescein ophthalmic strip 1 each (1 each Left Eye Given 2/16/25 0121)   oxyCODONE-acetaminophen 5-325 mg per tablet 1 tablet (1 tablet Oral Given 2/16/25 0122)   sulfamethoxazole-trimethoprim 800-160mg per tablet 1 tablet (1 tablet Oral Given 2/16/25 0130)   morphine injection 6 mg (6 mg Intravenous Given 2/16/25 0207)   methylPREDNISolone sodium succinate injection 250 mg (250 mg Intravenous Given 2/16/25 0208)   ondansetron injection 4 mg (4 mg Intravenous Given 2/16/25 0219)   proparacaine 0.5 % ophthalmic solution 1 drop (1 drop Both Eyes Given by Provider 2/16/25 0400)   fluorescein ophthalmic strip 5 each (5 each Both Eyes Given by Provider 2/16/25 0545)   iohexoL (OMNIPAQUE 350) injection 75 mL (75 mLs Intravenous Given 2/16/25 0511)     Imaging Results               CT Orbits W/WO Contrast (Final result)  Result time 02/16/25 06:52:59      Final result by Kiet Quinn MD (02/16/25 06:52:59)                   Impression:      Continued findings suspicious for left intraconal retrobulbar hemorrhage.  Mild mass effect on the optic nerve and slight overall proptosis.  No active extravasation.  Small volume left-sided subconjunctival hemorrhage and edema.    Acute fractures of the left orbital floor and medial wall of the left orbit.  Left inferior rectus courses adjacent to the fracture defect without convincing entrapment.  Suggest correlation  with physical exam and ophthalmology evaluation as clinically appropriate.    This report was flagged in Epic as abnormal.    Electronically signed by resident: Danilo Lujan  Date:    02/16/2025  Time:    05:35    Electronically signed by: Kiet Quinn MD  Date:    02/16/2025  Time:    06:52               Narrative:    EXAMINATION:  CT ORBITS W/WO CONTRAST    CLINICAL HISTORY:  Orbital trauma;    TECHNIQUE:  Axial images were acquired through the orbits before and after the administration of 75 cc Omnipaque 350 intravenous contrast.  Noncontrast, arterial, and venous phase images obtained.  Coronal and sagittal reconstructions were performed.    COMPARISON:  Same-date CT head/maxillofacial.    FINDINGS:  Right: No significant pre or postseptal inflammation.  Globes and intraorbital contents are unremarkable.  No fracture.    Left: Preseptal and periorbital soft tissue swelling.  Continued stranding, within more confluent 7 mm intraconal hyperdense focus, within the left retrobulbar fat, with mild mass effect on the optic nerve and slight overall proptosis.  No active extravasation seen.  There is small volume left-sided subconjunctival hemorrhage and edema (series 4, image 55).  Minimal emphysema along the anterior aspect of the left globe.  Globes are symmetric.  Anterior chambers are symmetric and unremarkable.  Small volume emphysema in the inferior aspect of the left orbit adjacent to fracture.    Comminuted depressed fracture of the left orbital floor, near the expected location of the infraorbital foramen.  Associated blood products within the left maxillary antrum.  Left inferior rectus courses adjacent to the fracture defect, without convincing entrapment.  Correlate with physical exam.    Minimally displaced fracture of the left lamina papyracea.  No medial rectus entrapment.    Intracranial Compartment (limited evaluation): Unremarkable.    Left maxillary hemosinus and mucosal thickening in the ethmoid  air cells.  Significant left-sided periorbital edema and small hematoma.                                        CT Cervical Spine Without Contrast (Final result)  Result time 02/16/25 01:43:28      Final result by Berry Malik MD (02/16/25 01:43:28)                   Impression:      Head CT, Maxillofacial CT, and Cervical Spine CT:    Comminuted depressed fracture of the left orbital floor.    Minimally nasally-displaced fracture of the left medial orbital wall.    Left retro bulbar hematoma with left proptosis and substantial stretching of the left optic nerve.  Emergency ophthalmology consultation is strongly recommended.    No acute intracranial abnormality or depressed calvarial fracture.    No acute cervical vertebral fracture.    Additional observations as detailed in the body of the report.    This report was flagged in Epic as abnormal.      Electronically signed by: Berry Malik  Date:    02/16/2025  Time:    01:43               Narrative:    EXAMINATION:  CT MAXILLOFACIAL WITHOUT CONTRAST; CT HEAD WITHOUT CONTRAST; CT CERVICAL SPINE WITHOUT CONTRAST    CLINICAL HISTORY:  Facial trauma, blunt;; Polytrauma, blunt;    TECHNIQUE:  Low dose axial images, sagittal and coronal reformations were obtained through the head, face, and cervical spine.    No IV contrast was administered for any of these 3 scans.    COMPARISON:  None    FINDINGS:  Artifacts related to beam hardening and or motion degrade portions of these scans.    Head CT:    No acute intracranial hemorrhage, depressed calvarial fracture, discrete acute large vascular territory brain infarct, discrete intracranial mass or mass effect, acute pathologic extra-axial fluid collection, midline shift, brain herniation, hydrocephalus, pneumocephalus, brain edema, or other acute intracranial abnormality is demonstrated.    Maxillofacial CT:    Left preseptal periorbital soft tissue swelling.    Comminuted depressed fracture of the left orbital floor,  extending across the anticipated course of the left infraorbital artery and nerve.    High attenuation fluid in left maxillary sinus compatible with blood products.    Soft tissue attenuation stranding and confluent opacity in the left retro bulbar fat, compatible with a left retro bulbar hematoma.  This is associated with substantial left proptosis is and straightened, thinned, stretched appearance of the left optic nerve.    The ocular globes appear normal in size and symmetry.  No evidence of dislocation of either ocular lens.    The left inferior rectus muscle appears thickened and projects across, and possibly minimally into, the fracture defect in the orbital floor.    Trace left orbital emphysema, mainly inferior extraconal.    Subtle slightly nasally-displaced fracture of the left medial orbital wall.    The left medial rectus muscle appears slightly thickened and protrudes slightly into the medial orbital wall defect.    The right orbit, mandible, and other maxillofacial bones demonstrate no additional acute abnormalities.    No dislocation of either TMJ.    Some teeth are absent, likely on a pre-existing basis.  Incidental supernumerary right mandibular tooth and torus palatinus.    Minimal mucosal thickening in portions of the sphenoid ethmoid, and frontal sinuses.    Some maxillary dental roots protrude into either maxillary sinus.    Additional areas of soft tissue swelling in the left facial cheek and possibly also in the chin.    Cervical Spine CT:    Reversed cervical lordosis.    Mild multilevel degenerative changes including small concentric posterior disc protrusions at C5-C6 and C6-C7 (with minimal encroachment upon the spinal canal).    No acute fracture deformity or traumatic vertebral malalignment is demonstrated in the cervical spine.    No cervical prevertebral soft tissue swelling or discrete paraspinal hematoma in the visualized neck.    The visualized airway is patent.    Thyroid gland is  incompletely visualized.    Allowing for presumed biapical blebs and bullae, no apical pulmonary or pleural injury is demonstrated.                                        CT Head Without Contrast (Final result)  Result time 02/16/25 01:43:28      Final result by Berry Malik MD (02/16/25 01:43:28)                   Impression:      Head CT, Maxillofacial CT, and Cervical Spine CT:    Comminuted depressed fracture of the left orbital floor.    Minimally nasally-displaced fracture of the left medial orbital wall.    Left retro bulbar hematoma with left proptosis and substantial stretching of the left optic nerve.  Emergency ophthalmology consultation is strongly recommended.    No acute intracranial abnormality or depressed calvarial fracture.    No acute cervical vertebral fracture.    Additional observations as detailed in the body of the report.    This report was flagged in Epic as abnormal.      Electronically signed by: Berry Malik  Date:    02/16/2025  Time:    01:43               Narrative:    EXAMINATION:  CT MAXILLOFACIAL WITHOUT CONTRAST; CT HEAD WITHOUT CONTRAST; CT CERVICAL SPINE WITHOUT CONTRAST    CLINICAL HISTORY:  Facial trauma, blunt;; Polytrauma, blunt;    TECHNIQUE:  Low dose axial images, sagittal and coronal reformations were obtained through the head, face, and cervical spine.    No IV contrast was administered for any of these 3 scans.    COMPARISON:  None    FINDINGS:  Artifacts related to beam hardening and or motion degrade portions of these scans.    Head CT:    No acute intracranial hemorrhage, depressed calvarial fracture, discrete acute large vascular territory brain infarct, discrete intracranial mass or mass effect, acute pathologic extra-axial fluid collection, midline shift, brain herniation, hydrocephalus, pneumocephalus, brain edema, or other acute intracranial abnormality is demonstrated.    Maxillofacial CT:    Left preseptal periorbital soft tissue swelling.    Comminuted  depressed fracture of the left orbital floor, extending across the anticipated course of the left infraorbital artery and nerve.    High attenuation fluid in left maxillary sinus compatible with blood products.    Soft tissue attenuation stranding and confluent opacity in the left retro bulbar fat, compatible with a left retro bulbar hematoma.  This is associated with substantial left proptosis is and straightened, thinned, stretched appearance of the left optic nerve.    The ocular globes appear normal in size and symmetry.  No evidence of dislocation of either ocular lens.    The left inferior rectus muscle appears thickened and projects across, and possibly minimally into, the fracture defect in the orbital floor.    Trace left orbital emphysema, mainly inferior extraconal.    Subtle slightly nasally-displaced fracture of the left medial orbital wall.    The left medial rectus muscle appears slightly thickened and protrudes slightly into the medial orbital wall defect.    The right orbit, mandible, and other maxillofacial bones demonstrate no additional acute abnormalities.    No dislocation of either TMJ.    Some teeth are absent, likely on a pre-existing basis.  Incidental supernumerary right mandibular tooth and torus palatinus.    Minimal mucosal thickening in portions of the sphenoid ethmoid, and frontal sinuses.    Some maxillary dental roots protrude into either maxillary sinus.    Additional areas of soft tissue swelling in the left facial cheek and possibly also in the chin.    Cervical Spine CT:    Reversed cervical lordosis.    Mild multilevel degenerative changes including small concentric posterior disc protrusions at C5-C6 and C6-C7 (with minimal encroachment upon the spinal canal).    No acute fracture deformity or traumatic vertebral malalignment is demonstrated in the cervical spine.    No cervical prevertebral soft tissue swelling or discrete paraspinal hematoma in the visualized neck.    The  visualized airway is patent.    Thyroid gland is incompletely visualized.    Allowing for presumed biapical blebs and bullae, no apical pulmonary or pleural injury is demonstrated.                                        CT Maxillofacial Without Contrast (Final result)  Result time 02/16/25 01:43:28      Final result by Berry Malik MD (02/16/25 01:43:28)                   Impression:      Head CT, Maxillofacial CT, and Cervical Spine CT:    Comminuted depressed fracture of the left orbital floor.    Minimally nasally-displaced fracture of the left medial orbital wall.    Left retro bulbar hematoma with left proptosis and substantial stretching of the left optic nerve.  Emergency ophthalmology consultation is strongly recommended.    No acute intracranial abnormality or depressed calvarial fracture.    No acute cervical vertebral fracture.    Additional observations as detailed in the body of the report.    This report was flagged in Epic as abnormal.      Electronically signed by: Berry Malik  Date:    02/16/2025  Time:    01:43               Narrative:    EXAMINATION:  CT MAXILLOFACIAL WITHOUT CONTRAST; CT HEAD WITHOUT CONTRAST; CT CERVICAL SPINE WITHOUT CONTRAST    CLINICAL HISTORY:  Facial trauma, blunt;; Polytrauma, blunt;    TECHNIQUE:  Low dose axial images, sagittal and coronal reformations were obtained through the head, face, and cervical spine.    No IV contrast was administered for any of these 3 scans.    COMPARISON:  None    FINDINGS:  Artifacts related to beam hardening and or motion degrade portions of these scans.    Head CT:    No acute intracranial hemorrhage, depressed calvarial fracture, discrete acute large vascular territory brain infarct, discrete intracranial mass or mass effect, acute pathologic extra-axial fluid collection, midline shift, brain herniation, hydrocephalus, pneumocephalus, brain edema, or other acute intracranial abnormality is demonstrated.    Maxillofacial CT:    Left  preseptal periorbital soft tissue swelling.    Comminuted depressed fracture of the left orbital floor, extending across the anticipated course of the left infraorbital artery and nerve.    High attenuation fluid in left maxillary sinus compatible with blood products.    Soft tissue attenuation stranding and confluent opacity in the left retro bulbar fat, compatible with a left retro bulbar hematoma.  This is associated with substantial left proptosis is and straightened, thinned, stretched appearance of the left optic nerve.    The ocular globes appear normal in size and symmetry.  No evidence of dislocation of either ocular lens.    The left inferior rectus muscle appears thickened and projects across, and possibly minimally into, the fracture defect in the orbital floor.    Trace left orbital emphysema, mainly inferior extraconal.    Subtle slightly nasally-displaced fracture of the left medial orbital wall.    The left medial rectus muscle appears slightly thickened and protrudes slightly into the medial orbital wall defect.    The right orbit, mandible, and other maxillofacial bones demonstrate no additional acute abnormalities.    No dislocation of either TMJ.    Some teeth are absent, likely on a pre-existing basis.  Incidental supernumerary right mandibular tooth and torus palatinus.    Minimal mucosal thickening in portions of the sphenoid ethmoid, and frontal sinuses.    Some maxillary dental roots protrude into either maxillary sinus.    Additional areas of soft tissue swelling in the left facial cheek and possibly also in the chin.    Cervical Spine CT:    Reversed cervical lordosis.    Mild multilevel degenerative changes including small concentric posterior disc protrusions at C5-C6 and C6-C7 (with minimal encroachment upon the spinal canal).    No acute fracture deformity or traumatic vertebral malalignment is demonstrated in the cervical spine.    No cervical prevertebral soft tissue swelling or  discrete paraspinal hematoma in the visualized neck.    The visualized airway is patent.    Thyroid gland is incompletely visualized.    Allowing for presumed biapical blebs and bullae, no apical pulmonary or pleural injury is demonstrated.                                    7:43 AM  Discussed with ophthalmologist  Patient cleared for discharge  Low threshold to return for vision changes  Patient will follow up with ENT, and ophtho outpatient    7:48 AM  Patient understands discharge instructions    Disposition: discharge home    Patient comfortable with plan for discharge. Patient counseled regarding exam, results, diagnosis, treatment, and plan.    Impression: Final diagnoses:  [S02.32XA] Closed fracture of left orbital floor, initial encounter  [H05.239] Retrobulbar hematoma (Primary)  [S02.832A] Closed fracture of medial wall of left orbit, initial encounter
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
## 2025-02-16 NOTE — SUBJECTIVE & OBJECTIVE
"Medications:  Continuous Infusions:  Scheduled Meds:  PRN Meds:     No current facility-administered medications on file prior to encounter.     Current Outpatient Medications on File Prior to Encounter   Medication Sig    albuterol (PROVENTIL/VENTOLIN HFA) 90 mcg/actuation inhaler Inhale 1-2 puffs into the lungs every 6 (six) hours as needed for Wheezing or Shortness of Breath. Rescue    cetirizine (ZYRTEC) 10 MG tablet Take 1 tablet (10 mg total) by mouth once daily.    [DISCONTINUED] oxyCODONE-acetaminophen (PERCOCET) 5-325 mg per tablet Take 1 tablet by mouth every 4 (four) hours as needed for Pain.       Review of patient's allergies indicates:  No Known Allergies    History reviewed. No pertinent past medical history.  Past Surgical History:   Procedure Laterality Date    TONSILLECTOMY       Family History    None       Tobacco Use    Smoking status: Every Day     Current packs/day: 0.25     Types: Cigarettes    Smokeless tobacco: Never   Substance and Sexual Activity    Alcohol use: Yes    Drug use: Never    Sexual activity: Not on file     Review of Systems  Objective:     Vital Signs (Most Recent):  Temp: 98.2 °F (36.8 °C) (02/16/25 0700)  Pulse: 64 (02/16/25 0900)  Resp: 18 (02/16/25 0900)  BP: (!) 157/88 (02/16/25 0900)  SpO2: 98 % (02/16/25 0900) Vital Signs (24h Range):  Temp:  [97.9 °F (36.6 °C)-98.4 °F (36.9 °C)] 98.2 °F (36.8 °C)  Pulse:  [54-70] 64  Resp:  [14-18] 18  SpO2:  [95 %-100 %] 98 %  BP: (135-157)/(74-90) 157/88     Weight: 104.3 kg (230 lb)  Body mass index is 27.27 kg/m².         Physical Exam   NAD  Left periorbital swelling, no   Subconjunctival hemorrhage  No diplopia   EOMI, no entrapment  Decreased V2 sensation, other CN intact   VSS      Significant Labs:  CBC: No results for input(s): "WBC", "RBC", "HGB", "HCT", "PLT", "MCV", "MCH", "MCHC" in the last 168 hours.  CMP: No results for input(s): "GLU", "CALCIUM", "ALBUMIN", "PROT", "NA", "K", "CO2", "CL", "BUN", "CREATININE", " ""ALKPHOS", "ALT", "AST", "BILITOT" in the last 168 hours.    Significant Diagnostics:  CT orbit showing minimal displaced fx of left lamina papyracea, depressed orbital floor, blood in left maxillary sinus.     "

## 2025-02-16 NOTE — ED NOTES
Patient and wife at bedside provided with warm blankets for comfort. Wife expresses interest in taking pt POV to destination facility. Assured her MD would likely encourage ambulance transfer, but would express interest/concern to MD. Pt connected to BP cuff and pulse oximeter. Call light within reach. Safety measures in place. Pt feeling nauseous at this time, will inquire about antiemetic.

## 2025-02-16 NOTE — ED NOTES
Pt reports he has already spoken with [facility] [employer] and filed a report. States he does not need them called again.
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
## 2025-02-21 ENCOUNTER — OFFICE VISIT (OUTPATIENT)
Dept: OTOLARYNGOLOGY | Facility: CLINIC | Age: 45
End: 2025-02-21

## 2025-02-21 VITALS — BODY MASS INDEX: 27.87 KG/M2 | WEIGHT: 235 LBS

## 2025-02-21 DIAGNOSIS — H05.239 RETROBULBAR HEMATOMA: ICD-10-CM

## 2025-02-21 DIAGNOSIS — S02.32XD CLOSED FRACTURE OF LEFT ORBITAL FLOOR WITH ROUTINE HEALING, SUBSEQUENT ENCOUNTER: ICD-10-CM

## 2025-02-21 PROCEDURE — 99213 OFFICE O/P EST LOW 20 MIN: CPT | Mod: PBBFAC | Performed by: OTOLARYNGOLOGY
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
## 2025-02-28 ENCOUNTER — OFFICE VISIT (OUTPATIENT)
Dept: OTOLARYNGOLOGY | Facility: CLINIC | Age: 45
End: 2025-02-28

## 2025-02-28 VITALS
DIASTOLIC BLOOD PRESSURE: 84 MMHG | HEART RATE: 61 BPM | BODY MASS INDEX: 28.5 KG/M2 | SYSTOLIC BLOOD PRESSURE: 133 MMHG | WEIGHT: 240.31 LBS

## 2025-02-28 DIAGNOSIS — S02.32XD CLOSED FRACTURE OF LEFT ORBITAL FLOOR WITH ROUTINE HEALING, SUBSEQUENT ENCOUNTER: Primary | ICD-10-CM

## 2025-02-28 PROCEDURE — 99214 OFFICE O/P EST MOD 30 MIN: CPT | Mod: PBBFAC | Performed by: OTOLARYNGOLOGY

## 2025-02-28 PROCEDURE — 99999 PR PBB SHADOW E&M-EST. PATIENT-LVL IV: CPT | Mod: PBBFAC,,, | Performed by: OTOLARYNGOLOGY
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
## 2025-03-07 ENCOUNTER — TELEPHONE (OUTPATIENT)
Dept: OPHTHALMOLOGY | Facility: CLINIC | Age: 45
End: 2025-03-07

## 2025-03-07 NOTE — TELEPHONE ENCOUNTER
----- Message from Josh Burr sent at 3/7/2025 12:06 PM CST -----  Regarding: Follow Up  Hi,This pt was seen for orbital fracture. Please schedule in optometry clinic in the next 2 weeks.Thank you,Josh Burr, [id number]

## 2025-06-15 ENCOUNTER — HOSPITAL ENCOUNTER (EMERGENCY)
Facility: HOSPITAL | Age: 45
Discharge: HOME | End: 2025-06-16
Attending: STUDENT IN AN ORGANIZED HEALTH CARE EDUCATION/TRAINING PROGRAM
Payer: MEDICAID

## 2025-06-15 DIAGNOSIS — R07.9 CHEST PAIN: ICD-10-CM

## 2025-06-15 DIAGNOSIS — Z87.891 SMOKING HISTORY: ICD-10-CM

## 2025-06-15 DIAGNOSIS — R07.89 ATYPICAL CHEST PAIN: Primary | ICD-10-CM

## 2025-06-15 LAB
ABSOLUTE EOSINOPHIL (OHS): 0.16 K/UL
ABSOLUTE MONOCYTE (OHS): 0.86 K/UL (ref 0.3–1)
ABSOLUTE NEUTROPHIL COUNT (OHS): 3.6 K/UL (ref 1.8–7.7)
ALBUMIN SERPL BCP-MCNC: 4 G/DL (ref 3.5–5.2)
ALP SERPL-CCNC: 58 UNIT/L (ref 40–150)
ALT SERPL W/O P-5'-P-CCNC: 19 UNIT/L (ref 10–44)
ANION GAP (OHS): 10 MMOL/L (ref 8–16)
AST SERPL-CCNC: 20 UNIT/L (ref 11–45)
BASOPHILS # BLD AUTO: 0.02 K/UL
BASOPHILS NFR BLD AUTO: 0.2 %
BILIRUB SERPL-MCNC: 0.3 MG/DL (ref 0.1–1)
BNP SERPL-MCNC: 17 PG/ML (ref 0–99)
BUN SERPL-MCNC: 21 MG/DL (ref 6–20)
CALCIUM SERPL-MCNC: 9.1 MG/DL (ref 8.7–10.5)
CHLORIDE SERPL-SCNC: 108 MMOL/L (ref 95–110)
CO2 SERPL-SCNC: 22 MMOL/L (ref 23–29)
CREAT SERPL-MCNC: 1.2 MG/DL (ref 0.5–1.4)
ERYTHROCYTE [DISTWIDTH] IN BLOOD BY AUTOMATED COUNT: 15.9 % (ref 11.5–14.5)
GFR SERPLBLD CREATININE-BSD FMLA CKD-EPI: >60 ML/MIN/1.73/M2
GLUCOSE SERPL-MCNC: 113 MG/DL (ref 70–110)
HCT VFR BLD AUTO: 38.1 % (ref 40–54)
HGB BLD-MCNC: 12 GM/DL (ref 14–18)
IMM GRANULOCYTES # BLD AUTO: 0.01 K/UL (ref 0–0.04)
IMM GRANULOCYTES NFR BLD AUTO: 0.1 % (ref 0–0.5)
LYMPHOCYTES # BLD AUTO: 3.98 K/UL (ref 1–4.8)
MCH RBC QN AUTO: 22.3 PG (ref 27–31)
MCHC RBC AUTO-ENTMCNC: 31.5 G/DL (ref 32–36)
MCV RBC AUTO: 71 FL (ref 82–98)
NUCLEATED RBC (/100WBC) (OHS): 0 /100 WBC
PLATELET # BLD AUTO: 221 K/UL (ref 150–450)
PMV BLD AUTO: 10.6 FL (ref 9.2–12.9)
POTASSIUM SERPL-SCNC: 4 MMOL/L (ref 3.5–5.1)
PROT SERPL-MCNC: 7.3 GM/DL (ref 6–8.4)
RBC # BLD AUTO: 5.39 M/UL (ref 4.6–6.2)
RELATIVE EOSINOPHIL (OHS): 1.9 %
RELATIVE LYMPHOCYTE (OHS): 46.1 % (ref 18–48)
RELATIVE MONOCYTE (OHS): 10 % (ref 4–15)
RELATIVE NEUTROPHIL (OHS): 41.7 % (ref 38–73)
SODIUM SERPL-SCNC: 140 MMOL/L (ref 136–145)
TROPONIN I SERPL DL<=0.01 NG/ML-MCNC: 0.01 NG/ML
WBC # BLD AUTO: 8.63 K/UL (ref 3.9–12.7)

## 2025-06-15 PROCEDURE — 80053 COMPREHEN METABOLIC PANEL: CPT | Performed by: STUDENT IN AN ORGANIZED HEALTH CARE EDUCATION/TRAINING PROGRAM

## 2025-06-15 PROCEDURE — 93005 ELECTROCARDIOGRAM TRACING: CPT

## 2025-06-15 PROCEDURE — 93010 ELECTROCARDIOGRAM REPORT: CPT | Mod: ,,, | Performed by: INTERNAL MEDICINE

## 2025-06-15 PROCEDURE — 85025 COMPLETE CBC W/AUTO DIFF WBC: CPT | Performed by: STUDENT IN AN ORGANIZED HEALTH CARE EDUCATION/TRAINING PROGRAM

## 2025-06-15 PROCEDURE — 84484 ASSAY OF TROPONIN QUANT: CPT | Performed by: STUDENT IN AN ORGANIZED HEALTH CARE EDUCATION/TRAINING PROGRAM

## 2025-06-15 PROCEDURE — 99285 EMERGENCY DEPT VISIT HI MDM: CPT | Mod: 25

## 2025-06-15 PROCEDURE — 83880 ASSAY OF NATRIURETIC PEPTIDE: CPT | Performed by: STUDENT IN AN ORGANIZED HEALTH CARE EDUCATION/TRAINING PROGRAM

## 2025-06-16 VITALS
BODY MASS INDEX: 27.77 KG/M2 | OXYGEN SATURATION: 100 % | HEIGHT: 78 IN | DIASTOLIC BLOOD PRESSURE: 86 MMHG | TEMPERATURE: 98 F | WEIGHT: 240 LBS | SYSTOLIC BLOOD PRESSURE: 132 MMHG | HEART RATE: 51 BPM | RESPIRATION RATE: 18 BRPM

## 2025-06-16 LAB — TROPONIN I SERPL DL<=0.01 NG/ML-MCNC: <0.006 NG/ML

## 2025-06-16 PROCEDURE — 93005 ELECTROCARDIOGRAM TRACING: CPT

## 2025-06-16 PROCEDURE — 93010 ELECTROCARDIOGRAM REPORT: CPT | Mod: ,,, | Performed by: INTERNAL MEDICINE

## 2025-06-16 NOTE — ED TRIAGE NOTES
Pt to ED c/o 8/10 intermittent mid sternal CP radiating to lt shoulder, SOB x1day. Pt stated activities do not make pain increase or decrease. -N/V. Pt denies any cardiac hx. Pt noted to be SB, HR 45-50's. GCS15, AAOx4

## 2025-06-16 NOTE — ED PROVIDER NOTES
Encounter Date: 6/15/2025       History     Chief Complaint   Patient presents with    Chest Pain     Pt reported to the ED with a CC of mid-sternal CP and SOB x12 hours. Pt's pain will alternate arms.      44-year-old male presents for evaluation of substernal chest pain, associated with shortness of breath and feeling sweaty, ongoing since this morning.  The episodes are brief, lasting 1-2 minutes, resolved on their own.  They are nonexertional.  Nothing makes it better or worse.  He denies cough, fevers or chills.  Denies nausea or vomiting.  He has not had symptoms like this in the past.  Patient does report 2 episodes of syncope, one in [place], and another in [place].  When he was in [place], he described a syncopal episode where EMS evaluated him and reported to him that the EKG looked like a heart attack, and advised him to go to a hospital for evaluation, but the patient did not want to do that, and he has not followed up with a doctor since.  Patient reports 20 pack-year smoking history, no other medical problems, no family history of early MI.  He takes no medication on a regular basis and has no known drug allergies.      Review of patient's allergies indicates:  No Known Allergies  No past medical history on file.  Past Surgical History:   Procedure Laterality Date    TONSILLECTOMY       No family history on file.  Social History[1]  Review of Systems   Respiratory:  Positive for chest tightness.        Physical Exam     Initial Vitals [06/15/25 2111]   BP Pulse Resp Temp SpO2   135/76 (!) 50 18 98.2 °F (36.8 °C) 98 %      MAP       --         Physical Exam    Nursing note and vitals reviewed.  Constitutional: He appears well-developed and well-nourished.   HENT:   Head: Normocephalic and atraumatic. Mouth/Throat: Oropharynx is clear and moist.   Eyes: Conjunctivae and EOM are normal. Pupils are equal, round, and reactive to light.   Neck: No thyromegaly present.   Normal range of  motion.  Cardiovascular:  Normal rate, regular rhythm and intact distal pulses.           Pulmonary/Chest: Breath sounds normal. No respiratory distress. He has no wheezes.   Abdominal: Abdomen is soft. Bowel sounds are normal. He exhibits no distension. There is no abdominal tenderness.   Musculoskeletal:         General: No tenderness or edema. Normal range of motion.      Cervical back: Normal range of motion.     Neurological: He is alert and oriented to person, place, and time. He has normal strength. No cranial nerve deficit.   Skin: Skin is warm and dry. No rash noted.   Psychiatric: He has a normal mood and affect. His behavior is normal. Thought content normal.         ED Course   Procedures  Labs Reviewed   COMPREHENSIVE METABOLIC PANEL - Abnormal       Result Value    Sodium 140      Potassium 4.0      Chloride 108      CO2 22 (*)     Glucose 113 (*)     BUN 21 (*)     Creatinine 1.2      Calcium 9.1      Protein Total 7.3      Albumin 4.0      Bilirubin Total 0.3      ALP 58      AST 20      ALT 19      Anion Gap 10      eGFR >60     CBC WITH DIFFERENTIAL - Abnormal    WBC 8.63      RBC 5.39      HGB 12.0 (*)     HCT 38.1 (*)     MCV 71 (*)     MCH 22.3 (*)     MCHC 31.5 (*)     RDW 15.9 (*)     Platelet Count 221      MPV 10.6      Nucleated RBC 0      Neut % 41.7      Lymph % 46.1      Mono % 10.0      Eos % 1.9      Basophil % 0.2      Imm Grans % 0.1      Neut # 3.60      Lymph # 3.98      Mono # 0.86      Eos # 0.16      Baso # 0.02      Imm Grans # 0.01     TROPONIN I - Normal    Troponin-I 0.007     B-TYPE NATRIURETIC PEPTIDE - Normal    BNP 17     TROPONIN I - Normal    Troponin-I <0.006     CBC W/ AUTO DIFFERENTIAL    Narrative:     The following orders were created for panel order CBC auto differential.  Procedure                               Abnormality         Status                     ---------                               -----------         ------                     CBC with  Differential[[id number]]       Abnormal            Final result                 Please view results for these tests on the individual orders.     EKG Readings: (Independently Interpreted)   EKG with bradycardia, regular rhythm, normal axis, no acute ST elevations or depressions, normal OH, QRS and QT interval. Interpreted by me.         Imaging Results              X-Ray Chest AP Portable (Final result)  Result time 06/15/25 21:41:41      Final result by Guicho Govea MD (06/15/25 21:41:41)                   Impression:      No acute cardiopulmonary process identified.      Electronically signed by: Giucho Govea MD  Date:    06/15/2025  Time:    21:41               Narrative:    EXAMINATION:  XR CHEST AP PORTABLE    CLINICAL HISTORY:  Chest Pain;    TECHNIQUE:  Single frontal view of the chest was performed.    COMPARISON:  None    FINDINGS:  Cardiac silhouette is normal in size.  Lungs are symmetrically expanded.  No evidence of focal consolidative process, pneumothorax, or significant pleural effusion.  No acute osseous abnormality identified.                                    X-Rays:   Independently Interpreted Readings:   Other Readings:  I reviewed the CXR independently of the radiologist.     Chest x-ray was negative for acute cardiopulmonary abnormalities, infiltrates or bony abnormalities.        Medications - No data to display  Medical Decision Making  44 year old male smoker presents for intermittent chest pain, SOB and episodes of sweating, now resolved. Vitals notable for HR in the 40's to 50's, otherwise WNL. EKG w/o acute ischemic changes, questionable V2 q waves, trop WNL x 2. Patient not likely to have ACS. Will refer to cardiology given associated symptoms, hx of smoking. Other workup normal. Doubt life threatening cause of chest pain at this time. Will discharge with outpatient follow up and return precautions for worsening symptoms.     Amount and/or Complexity of Data Reviewed  Labs:  ordered. Decision-making details documented in ED Course.  Radiology: ordered. Decision-making details documented in ED Course.               ED Course as of 06/16/25 0211   Sun Kyler 15, 2025   2141 X-Ray Chest AP Portable  I reviewed the CXR independently of the radiologist.     Chest x-ray was negative for acute cardiopulmonary abnormalities, infiltrates or bony abnormalities.     [BS]   Mon Jun 16, 2025 0211 Troponin I #2 [BS]   0211 B-Type natriuretic peptide (BNP) [BS]   0211 Comprehensive metabolic panel(!) [BS]   0211 CBC auto differential(!) [BS]   0211 X-Ray Chest AP Portable [BS]      ED Course User Index  [BS] Ángel Finley MD                           Clinical Impression:  Final diagnoses:  [R07.9] Chest pain  [R07.89] Atypical chest pain (Primary)  [Z87.891] Smoking history          ED Disposition Condition    Discharge Stable          ED Prescriptions    None       Follow-up Information       Follow up With Specialties Details Why Contact Info    Cathleen Rincon MD Interventional Cardiology, Cardiology Call in 1 day To set up a follow-up appointment, To recheck today's symptoms [address]  [address]  [facility] [address]  [phone]                     [1]   Social History  Tobacco Use    Smoking status: Every Day     Current packs/day: 0.25     Types: Cigarettes    Smokeless tobacco: Never   Substance Use Topics    Alcohol use: Yes    Drug use: Never        Ángel Finley MD  06/16/25 0211

## 2025-06-17 LAB
OHS QRS DURATION: 102 MS
OHS QRS DURATION: 104 MS
OHS QTC CALCULATION: 381 MS
OHS QTC CALCULATION: 392 MS

## (undated) DEVICE — PACK ENDOSCOPY GENERAL

## (undated) DEVICE — GLOVE BIOGEL 7.5

## (undated) DEVICE — CLIPPER BLADE MOD 4406 (CAREF)

## (undated) DEVICE — SEE L#120831

## (undated) DEVICE — BLANKET UPPER BODY 78.7X29.9IN

## (undated) DEVICE — SYS CLSR DERMABOND PRINEO 22CM

## (undated) DEVICE — SUT VICRYL PLUS 3-0 SH 18IN

## (undated) DEVICE — NDL HYPO REG 25G X 1 1/2

## (undated) DEVICE — Device

## (undated) DEVICE — SUT STRATAFIX 4-0 30CM PS-2

## (undated) DEVICE — DRAIN PENROSE XRAY 18 X 1/4 ST

## (undated) DEVICE — APPLICATOR CHLORAPREP ORN 26ML

## (undated) DEVICE — SEE MEDLINE ITEM 157110

## (undated) DEVICE — ELECTRODE REM PLYHSV RETURN 9

## (undated) DEVICE — SYR 10CC LUER LOCK

## (undated) DEVICE — COVER OVERHEAD SURG LT BLUE